# Patient Record
Sex: FEMALE | Race: WHITE
[De-identification: names, ages, dates, MRNs, and addresses within clinical notes are randomized per-mention and may not be internally consistent; named-entity substitution may affect disease eponyms.]

---

## 2018-01-18 ENCOUNTER — RESULT REVIEW (OUTPATIENT)
Age: 78
End: 2018-01-18

## 2019-11-19 ENCOUNTER — APPOINTMENT (OUTPATIENT)
Dept: OBGYN | Facility: CLINIC | Age: 79
End: 2019-11-19
Payer: MEDICARE

## 2019-11-19 VITALS
WEIGHT: 193 LBS | SYSTOLIC BLOOD PRESSURE: 136 MMHG | DIASTOLIC BLOOD PRESSURE: 72 MMHG | BODY MASS INDEX: 32.95 KG/M2 | HEIGHT: 64 IN

## 2019-11-19 DIAGNOSIS — R07.1 CHEST PAIN ON BREATHING: ICD-10-CM

## 2019-11-19 DIAGNOSIS — Z12.39 ENCOUNTER FOR OTHER SCREENING FOR MALIGNANT NEOPLASM OF BREAST: ICD-10-CM

## 2019-11-19 DIAGNOSIS — R07.81 PLEURODYNIA: ICD-10-CM

## 2019-11-19 PROBLEM — Z00.00 ENCOUNTER FOR PREVENTIVE HEALTH EXAMINATION: Status: ACTIVE | Noted: 2019-11-19

## 2019-11-19 PROCEDURE — 99213 OFFICE O/P EST LOW 20 MIN: CPT

## 2019-11-19 NOTE — REVIEW OF SYSTEMS
[Sight Problems] : sight problems [Wrist Pain] : wrist pain [Breast Pain] : breast pain [Dizziness] : dizziness [Nl] : Integumentary [FreeTextEntry2] : general back pain

## 2019-11-19 NOTE — HISTORY OF PRESENT ILLNESS
[Last Mammogram ___] : Last Mammogram was [unfilled] [Last Bone Density ___] : Last bone density studies [unfilled] [Last Pap ___] : Last cervical pap smear was [unfilled] [Definite:  ___ (Date)] : the last menstrual period was [unfilled] [Menarche Age: ____] : age at menarche was [unfilled] [No] : no [Right Breast] : right [___ Months] : started [unfilled] months ago [6/10] : is 6/10 in severity [FreeTextEntry9] : feels like a bad bruise per patient [Sexually Active] : is not sexually active

## 2019-11-19 NOTE — END OF VISIT
[FreeTextEntry3] : I, Peng Santana, acted solely as a scribe for Dr. Hurley on this date 11/19/2019.\par All medical record entries made by the Scribe were at my, Dr. Hurley's direction and personally dictated by me on  11/19/2019. I have reviewed the chart and agree that the record accurately reflects my personal performance of the history, physical exam, assessment and plan. I have also personally directed, reviewed, and agreed with the chart.\par \par

## 2019-11-19 NOTE — PHYSICAL EXAM
[Alert] : alert [Awake] : awake [Oriented x3] : oriented to person, place, and time [Acute Distress] : no acute distress [Examination Of The Breasts] : a normal appearance [Normal] : normal [Depressed Mood] : not depressed [No Masses] : no breast masses were palpable [Flat Affect] : affect not flat

## 2022-05-02 ENCOUNTER — APPOINTMENT (OUTPATIENT)
Dept: ORTHOPEDIC SURGERY | Facility: CLINIC | Age: 82
End: 2022-05-02
Payer: MEDICARE

## 2022-05-02 VITALS — WEIGHT: 185 LBS | BODY MASS INDEX: 31.58 KG/M2 | HEIGHT: 64 IN

## 2022-05-02 DIAGNOSIS — Z78.9 OTHER SPECIFIED HEALTH STATUS: ICD-10-CM

## 2022-05-02 DIAGNOSIS — S73.192A OTHER SPRAIN OF LEFT HIP, INITIAL ENCOUNTER: ICD-10-CM

## 2022-05-02 DIAGNOSIS — Z86.79 PERSONAL HISTORY OF OTHER DISEASES OF THE CIRCULATORY SYSTEM: ICD-10-CM

## 2022-05-02 DIAGNOSIS — Z85.828 PERSONAL HISTORY OF OTHER MALIGNANT NEOPLASM OF SKIN: ICD-10-CM

## 2022-05-02 DIAGNOSIS — Z86.39 PERSONAL HISTORY OF OTHER ENDOCRINE, NUTRITIONAL AND METABOLIC DISEASE: ICD-10-CM

## 2022-05-02 PROCEDURE — 99203 OFFICE O/P NEW LOW 30 MIN: CPT

## 2022-05-02 RX ORDER — LATANOPROST/PF 0.005 %
0.01 DROPS OPHTHALMIC (EYE)
Qty: 2 | Refills: 0 | Status: DISCONTINUED | COMMUNITY
Start: 2022-02-17

## 2022-05-02 RX ORDER — GABAPENTIN 100 MG/1
100 CAPSULE ORAL
Qty: 30 | Refills: 0 | Status: DISCONTINUED | COMMUNITY
Start: 2022-04-18

## 2022-05-02 RX ORDER — CEPHALEXIN 500 MG/1
500 CAPSULE ORAL
Qty: 14 | Refills: 0 | Status: DISCONTINUED | COMMUNITY
Start: 2022-03-26

## 2022-05-02 RX ORDER — UBIDECARENONE 30 MG
CAPSULE ORAL
Refills: 0 | Status: ACTIVE | COMMUNITY

## 2022-05-02 RX ORDER — VERAPAMIL HYDROCHLORIDE 180 MG/1
180 TABLET ORAL
Qty: 90 | Refills: 0 | Status: DISCONTINUED | COMMUNITY
Start: 2021-12-14

## 2022-05-02 RX ORDER — CYCLOBENZAPRINE HYDROCHLORIDE 5 MG/1
5 TABLET, FILM COATED ORAL
Qty: 15 | Refills: 0 | Status: DISCONTINUED | COMMUNITY
Start: 2022-04-12

## 2022-05-02 RX ORDER — EVOLOCUMAB 140 MG/ML
140 INJECTION, SOLUTION SUBCUTANEOUS
Qty: 2 | Refills: 0 | Status: DISCONTINUED | COMMUNITY
Start: 2021-08-24

## 2022-05-02 RX ORDER — METHYLPREDNISOLONE 4 MG/1
4 TABLET ORAL
Qty: 1 | Refills: 0 | Status: COMPLETED | COMMUNITY
Start: 2022-05-02

## 2022-05-02 RX ORDER — VERAPAMIL HYDROCHLORIDE 80 MG/1
TABLET ORAL
Refills: 0 | Status: ACTIVE | COMMUNITY

## 2022-05-02 RX ORDER — CLOTRIMAZOLE AND BETAMETHASONE DIPROPIONATE 10; .5 MG/G; MG/G
1-0.05 CREAM TOPICAL
Qty: 45 | Refills: 0 | Status: DISCONTINUED | COMMUNITY
Start: 2021-11-01

## 2022-05-02 RX ORDER — CLOPIDOGREL BISULFATE 75 MG/1
75 TABLET, FILM COATED ORAL
Qty: 90 | Refills: 0 | Status: DISCONTINUED | COMMUNITY
Start: 2021-06-22

## 2022-05-02 RX ORDER — MELOXICAM 15 MG/1
15 TABLET ORAL
Qty: 30 | Refills: 0 | Status: DISCONTINUED | COMMUNITY
Start: 2022-04-06

## 2022-05-02 RX ORDER — EVOLOCUMAB 140 MG/ML
140 INJECTION, SOLUTION SUBCUTANEOUS
Refills: 0 | Status: ACTIVE | COMMUNITY

## 2022-05-02 RX ORDER — MULTIVIT-MIN/FA/LYCOPEN/LUTEIN .4-300-25
TABLET ORAL
Refills: 0 | Status: ACTIVE | COMMUNITY

## 2022-05-02 NOTE — REASON FOR VISIT
[FreeTextEntry2] : MRI impressions L hip: 1. Complex tear of the anterior superior posterior superior labrum with\par extension of the tear into the anterior and posterior labrum.\par 2. Mild osteoarthrosis of the left hip joint.\par \par MRI impressions lumbar spine: Mild scoliosis. Grade 1 anterolisthesis of L4 on L5.\par \par Multilevel degenerative spondylosis as detailed above.\par \par Modic type I endplate changes at L1-L2 level.

## 2022-05-02 NOTE — PHYSICAL EXAM
[Left] : left hip [5___] : adduction 5[unfilled]/5 [] : no ecchymosis [TWNoteComboBox7] : flexion 110 degrees [de-identified] : abduction 30 degrees [de-identified] : external rotation 30 degrees [TWNoteComboBox6] : internal rotation 40 degrees

## 2022-05-02 NOTE — HISTORY OF PRESENT ILLNESS
[de-identified] : Patient is here today for the left hip. Patient denies any treatment or sx for the hip. The pain began about 1 yr ago, denies any traumatic injury. SHe states her pain comes and goes, and comes out of no where. Patient had MRIs of the lumbar spine and hip last week at . She states the pain has come and gone in the past. SHe notes pain in the groin worse with WB and activiity. She has had PT for the neck and back in the past

## 2022-05-02 NOTE — DISCUSSION/SUMMARY
[de-identified] : Options were discussed. She is has tried NSAIDS without relief\par and gabapentin without relief. She will take the Medrol pack and FU in 1 month. \par

## 2022-05-03 RX ORDER — METHYLPREDNISOLONE 4 MG/1
4 TABLET ORAL
Qty: 1 | Refills: 0 | Status: ACTIVE | COMMUNITY
Start: 2022-05-03 | End: 1900-01-01

## 2022-06-08 ENCOUNTER — APPOINTMENT (OUTPATIENT)
Dept: ORTHOPEDIC SURGERY | Facility: CLINIC | Age: 82
End: 2022-06-08